# Patient Record
Sex: FEMALE | Race: WHITE
[De-identification: names, ages, dates, MRNs, and addresses within clinical notes are randomized per-mention and may not be internally consistent; named-entity substitution may affect disease eponyms.]

---

## 2018-02-16 ENCOUNTER — HOSPITAL ENCOUNTER (EMERGENCY)
Dept: HOSPITAL 61 - ER | Age: 38
Discharge: HOME | End: 2018-02-16
Payer: COMMERCIAL

## 2018-02-16 DIAGNOSIS — B34.9: Primary | ICD-10-CM

## 2018-02-16 DIAGNOSIS — Z20.828: ICD-10-CM

## 2018-02-16 LAB
INFLUENZA A PATIENT: NEGATIVE
INFLUENZA B PATIENT: NEGATIVE
OBC FLU: (no result)

## 2018-02-16 PROCEDURE — 87804 INFLUENZA ASSAY W/OPTIC: CPT

## 2018-02-16 PROCEDURE — 99284 EMERGENCY DEPT VISIT MOD MDM: CPT

## 2018-02-16 RX ADMIN — IBUPROFEN 1 MG: 800 TABLET ORAL at 17:11

## 2020-12-03 ENCOUNTER — HOSPITAL ENCOUNTER (EMERGENCY)
Dept: HOSPITAL 61 - ER | Age: 40
Discharge: HOME | End: 2020-12-03
Payer: COMMERCIAL

## 2020-12-03 VITALS — SYSTOLIC BLOOD PRESSURE: 134 MMHG | DIASTOLIC BLOOD PRESSURE: 100 MMHG

## 2020-12-03 VITALS — BODY MASS INDEX: 32.31 KG/M2 | HEIGHT: 59 IN | WEIGHT: 160.28 LBS

## 2020-12-03 DIAGNOSIS — M54.42: ICD-10-CM

## 2020-12-03 DIAGNOSIS — Z90.710: ICD-10-CM

## 2020-12-03 DIAGNOSIS — M54.41: ICD-10-CM

## 2020-12-03 DIAGNOSIS — G89.29: Primary | ICD-10-CM

## 2020-12-03 PROCEDURE — 99284 EMERGENCY DEPT VISIT MOD MDM: CPT

## 2020-12-03 PROCEDURE — 96372 THER/PROPH/DIAG INJ SC/IM: CPT

## 2020-12-03 NOTE — ED.ADGEN
Past Medical History


Past Medical History:  Other


Additional Past Medical Histor:  VAGINAL AND RECTAL PROLAPSE


Past Surgical History:  Hysterectomy


Additional Past Surgical Histo:  AUGUST 12, 2020 HYSTERECTOMY


Smoking Status:  Never Smoker


Alcohol Use:  None


Drug Use:  None





General Adult


EDM:


Chief Complaint:  BACK PAIN OR INJURY





HPI:


HPI:





Patient is a 40  year old female, accompanied by a family member, who presents 

emergency department with complaints of low back pain that shoots into both of 

her legs.  Patient states she was seen at [facility] 2 days ago and had 

MRI and was diagnosed with bulging disks.  She denies any loss of bowel or blad

audie control, or saddle anesthesia.  She denies any fever, cough, shortness of 

breath, or weakness of her lower extremities.  Patient currently rates her pain 

a 10 out of 10 on the pain scale, she denies any alleviating factors.  She 

denies any recent injury or fall.





Review of Systems:


Review of Systems:


Complete ROS is negative unless otherwise noted in HPI.





Current Medications:





Current Medications








 Medications


  (Trade)  Dose


 Ordered  Sig/Quentin  Start Time


 Stop Time Status Last Admin


Dose Admin


 


 Methylprednisolone


 Sodium Succinate


  (SOLU-Medrol


 125MG VIAL)  125 mg  1X  ONCE  12/3/20 22:30


 12/3/20 22:31 DC 12/3/20 22:10


125 MG


 


 Morphine Sulfate


  (Morphine


 Sulfate)  5 mg  1X  ONCE  12/3/20 22:30


 12/3/20 22:31 DC 12/3/20 22:10


5 MG











Allergies:


Allergies:





Allergies








Coded Allergies Type Severity Reaction Last Updated Verified


 


  No Known Drug Allergies    9/20/15 No











Physical Exam:


PE:


See Above


Constitutional: Well developed, well nourished, no acute distress, non-toxic 

appearance, appears uncomfortable. []


HENT: Normocephalic, atraumatic, bilateral external ears normal, nose normal. []


Eyes: PERRLA, EOMI, conjunctiva normal, no discharge. [] 


Neck: Normal range of motion, no stridor. [] 


Cardiovascular:Heart rate regular rhythm


Lungs & Thorax:  Respirations even and unlabored, no retractions, no respiratory

 distress


Back: Increased pain with movement of bilateral lower extremities, no bony 

tenderness, no step-off, no crepitus


Skin: Warm, dry, no erythema, no rash. [] 


Extremities: No cyanosis, ROM intact, no edema. [] 


Neurologic: Alert and oriented X 3, no focal deficits noted. []


Psychologic: Affect normal, judgement normal, mood normal. []





Current Patient Data:


Vital Signs:





                                   Vital Signs








  Date Time  Temp Pulse Resp B/P (MAP) Pulse Ox O2 Delivery O2 Flow Rate FiO2


 


12/3/20 21:32 98.2 101 20 134/100 (111) 100 Room Air  





 98.2       











EKG:


EKG:


[]





Heart Score:


Risk Factors:


Risk Factors:  DM, Current or recent (<one month) smoker, HTN, HLP, family 

history of CAD, obesity.


Risk Scores:


Score 0 - 3:  2.5% MACE over next 6 weeks - Discharge Home


Score 4 - 6:  20.3% MACE over next 6 weeks - Admit for Clinical Observation


Score 7 - 10:  72.7% MACE over next 6 weeks - Early Invasive Strategies





Radiology/Procedures:


Radiology/Procedures:


[]





Course & Med Decision Making:


Course & Med Decision Making


Pertinent Labs and Imaging studies reviewed. (See chart for details)


40-year-old female presented to the emergency department with complaints of 

bilateral low back pain that radiated to her legs.


Patient was given an IM injection of morphine and Solu-Medrol.


Patient reported complete resolution of the pain after his medications.


Prescriptions were written for naproxen and a prednisone taper.  The patient was

 provided with neurosurgery's information for follow-up.  She was encouraged to 

return the ER if symptoms worsen or fever develop.





Patient verbalized an understanding of home care, medications, follow-up, and 

return to ED instructions and was in agreement with the plan of care.


[]





I have reviewed the PA/NP's note and Plan of Care.  I was available for 

consultation as needed during the patient's visit in the emergency department.  

I agree with the clinical impression, plans and disposition.





Dragon Disclaimer:


Dragon Disclaimer:


This electronic medical record was generated, in whole or in part, using a voice

 recognition dictation system.





Departure


Departure


Impression:  


   Primary Impression:  


   Chronic low back pain with bilateral sciatica


Disposition:  01 DC HOME SELF CARE/HOMELESS


Condition:  STABLE


Referrals:  


NO PCP (PCP)








ÁNGELA NIÑO MD


Patient Instructions:  Chronic Back Pain





Additional Instructions:  


Fill the prescriptions take as directed for back pain.  Recommend that you call 

our neurosurgery and Dr. Niño for further evaluation and treatment of your 

chronic back pain due to bulging disks.  Return to the ER if your symptoms 

worsen or fever develops.


Scripts


Naproxen (NAPROXEN) 500 Mg Tablet


1 TAB PO BID PRN for PAIN for 10 Days, #20 TAB 0 Refills


   Prov: MARION CASTILLO         12/3/20 


Prednisone (PREDNISONE) 20 Mg Tablet


1 TAB PO UD for 12 Days, #15 TAB


   2 tabs by mouth days 1,2,3


   then 1.5 tabs by mouth days 4,5,6


   then 1 tab by mouth days 7,8,9


   then 0.5 tab by mouth day 10,11,12


   Prov: MARION CASTILLO         12/3/20





Problem Qualifiers








   Primary Impression:  


   Chronic low back pain with bilateral sciatica


   Back pain laterality:  bilateral  Qualified Codes:  M54.42 - Lumbago with 

   sciatica, left side; M54.41 - Lumbago with sciatica, right side; G89.29 - 

   Other chronic pain








MARION CASTILLO        Dec 3, 2020 22:16


HONG GAO MD               Dec 4, 2020 00:25

## 2020-12-06 ENCOUNTER — HOSPITAL ENCOUNTER (EMERGENCY)
Dept: HOSPITAL 61 - ER | Age: 40
Discharge: HOME | End: 2020-12-06
Payer: COMMERCIAL

## 2020-12-06 VITALS — DIASTOLIC BLOOD PRESSURE: 80 MMHG | SYSTOLIC BLOOD PRESSURE: 151 MMHG

## 2020-12-06 VITALS — WEIGHT: 158.73 LBS | BODY MASS INDEX: 32 KG/M2 | HEIGHT: 59 IN

## 2020-12-06 DIAGNOSIS — Z90.710: ICD-10-CM

## 2020-12-06 DIAGNOSIS — M54.32: Primary | ICD-10-CM

## 2020-12-06 DIAGNOSIS — F41.9: ICD-10-CM

## 2020-12-06 DIAGNOSIS — F31.9: ICD-10-CM

## 2020-12-06 LAB
APTT PPP: YELLOW S
BACTERIA #/AREA URNS HPF: (no result) /HPF
BILIRUB UR QL STRIP: NEGATIVE
FIBRINOGEN PPP-MCNC: CLEAR MG/DL
NITRITE UR QL STRIP: NEGATIVE
PH UR STRIP: 6 [PH]
PROT UR STRIP-MCNC: NEGATIVE MG/DL
RBC #/AREA URNS HPF: (no result) /HPF (ref 0–2)
UROBILINOGEN UR-MCNC: 0.2 MG/DL
WBC #/AREA URNS HPF: (no result) /HPF (ref 0–4)

## 2020-12-06 PROCEDURE — 81001 URINALYSIS AUTO W/SCOPE: CPT

## 2020-12-06 PROCEDURE — 99283 EMERGENCY DEPT VISIT LOW MDM: CPT

## 2020-12-06 PROCEDURE — 81025 URINE PREGNANCY TEST: CPT

## 2020-12-06 NOTE — PHYS DOC
Past Medical History


Past Medical History:  Anxiety, Bipolar, Depression, Other


Additional Past Medical Histor:  VAGINAL AND RECTAL PROLAPSE, BULGING DISC


Past Surgical History:  Hysterectomy


Additional Past Surgical Histo:  AUGUST 12, 2020 HYSTERECTOMY, RECTAL PROLAPSE 

REPAIR


Smoking Status:  Never Smoker


Alcohol Use:  None


Drug Use:  None





General Adult


EDM:


Chief Complaint:  GROIN PAIN





HPI:


HPI:





Patient is a 40 year old female who presents with left groin pain that radiates 

into her left leg. She reports the pain began after she had surgery in August 

for a hysterectomy and rectal prolapse repair. Due to pain not resolving she had

an MRI done at  which showed a bulging disc. Pain is described as burning or 

tingling. She reports pain is worse with ice/heat and there is little 

improvement after taking tylenol/ibuprofen. Pain is preventing her from doing 

activities of daily living. She has an appointment in 2-3 days with her PCP and 

an appointment with pain management on Thursday. She also describes urinary 

frequency with a foul smell recently.





Review of Systems:


Review of Systems:





Constitutional: Denies fever or chills 


HENT: Denies nasal congestion or sore throat


Respiratory: Denies cough or shortness of breath 


Cardiovascular: Denies chest pain or palpitations


GI: Reports abdominal pain; denies nausea or vomiting


: Denies dysuria or hematuria, reports urinary frequency


Musculoskeletal: Reports back pain and joint pain


Integument: Denies rash or skin lesions, reports 2 bruises 


Neurologic: Denies headache, focal weakness or sensory changes





Complete systems were reviewed and found to be within normal limits, except as 

documented in this note.





Allergies:


Allergies:





Allergies








Coded Allergies Type Severity Reaction Last Updated Verified


 


  No Known Drug Allergies    9/20/15 No











Physical Exam:


PE:





Constitutional: Well developed, well nourished, no acute distress, non-toxic 

appearance


HENT: Normocephalic, atraumatic


Eyes: PERRL, EOMI, conjunctiva normal, no discharge


Neck: Normal range of motion, no tenderness, supple


Lungs & Thorax:  No respiratory distress, equal chest rise and fall


Abdomen: Soft, no tenderness, no guarding/rebound tenderness/distention; pelvis 

stable and nontender


Skin: Warm, dry, no erythema, no rash


Back: No midline tenderness, left paraspinal tenderness noted, no CVA tenderness


Extremities: No tenderness, ROM intact, no edema, pain to left leg with straight

leg raise


Neurologic: Alert and oriented X 3,  no focal deficits noted


Psychologic: Affect normal, judgment normal





Current Patient Data:


Vital Signs:





                                   Vital Signs








  Date Time  Temp Pulse Resp B/P (MAP) Pulse Ox O2 Delivery O2 Flow Rate FiO2


 


12/6/20 19:07 98.1 113 16 151/80 (103) 97 Room Air  





 98.1       











Course & Med Decision Making:


Course & Med Decision Making


Pertinent Labs reviewed. (See chart for details)





Patient is a 40 year old female who presents with left groin pain that radiates 

into her left leg. 





Patient has appointments with PCP and pain management this week, but is needing 

something for pain in the meantime. Percocet provided in the ED, her son drove 

her. Prescriptions for percocet and flexeril provided for home.





Associated foul smelling urine reported, UA ordered. UA shows no signs of 

infection.





Patient stable for discharge with outpatient follow-up with PCP/pain management.

Discussed findings and plan with patient and family, who acknowledge un

derstanding and agreement.





Dragon Disclaimer:


Dragon Disclaimer:


This electronic medical record was generated, in whole or in part, using a voice

recognition dictation system.





Departure


Departure


Impression:  


   Primary Impression:  


   Sciatica of left side


Disposition:  01 DC HOME SELF CARE/HOMELESS


Condition:  STABLE


Referrals:  


NO PCP (PCP)








TORSTEN CHAIREZ MD


Patient Instructions:  Sciatica, Easy-to-Read





Additional Instructions:  


Follow up with primary care provider. Follow up with  pain management or with 

Dr. Torsten Chairez (pain management)- referral above


Scripts


Cyclobenzaprine Hcl (CYCLOBENZAPRINE HCL) 10 Mg Tablet


1 TAB PO TID PRN for MUSCLE PAIN, #14 TAB


   Prov: ONEYDA LUNA DO         12/6/20 


Oxycodone/Apap 5-325 (PERCOCET 5-325 MG TABLET **) 1 Each Tablet


0.5 TAB PO PRN Q6HRS PRN for PAIN, #6 TAB 0 Refills


   Prov: ONEYDA LUNA DO         12/6/20











ONEYDA LUNA DO              Dec 6, 2020 19:41

## 2021-03-19 ENCOUNTER — HOSPITAL ENCOUNTER (EMERGENCY)
Dept: HOSPITAL 61 - ER | Age: 41
Discharge: HOME | End: 2021-03-19
Payer: COMMERCIAL

## 2021-03-19 VITALS — SYSTOLIC BLOOD PRESSURE: 149 MMHG | DIASTOLIC BLOOD PRESSURE: 100 MMHG

## 2021-03-19 VITALS — WEIGHT: 160.28 LBS | HEIGHT: 58 IN | BODY MASS INDEX: 33.64 KG/M2

## 2021-03-19 DIAGNOSIS — F32.9: ICD-10-CM

## 2021-03-19 DIAGNOSIS — F41.9: ICD-10-CM

## 2021-03-19 DIAGNOSIS — M54.42: ICD-10-CM

## 2021-03-19 DIAGNOSIS — Z90.710: ICD-10-CM

## 2021-03-19 DIAGNOSIS — Z98.890: ICD-10-CM

## 2021-03-19 DIAGNOSIS — G89.29: Primary | ICD-10-CM

## 2021-03-19 LAB
AMPHETAMINE/METHAMPHETAMINE: (no result)
APTT PPP: YELLOW S
BACTERIA #/AREA URNS HPF: (no result) /HPF
BARBITURATES UR-MCNC: (no result) UG/ML
BENZODIAZ UR-MCNC: (no result) UG/L
BILIRUB UR QL STRIP: NEGATIVE
CANNABINOIDS UR-MCNC: (no result) UG/L
COCAINE UR-MCNC: (no result) NG/ML
FIBRINOGEN PPP-MCNC: CLEAR MG/DL
METHADONE SERPL-MCNC: (no result) NG/ML
NITRITE UR QL STRIP: NEGATIVE
OPIATES UR-MCNC: (no result) NG/ML
PCP SERPL-MCNC: (no result) MG/DL
PH UR STRIP: 7 [PH]
PROT UR STRIP-MCNC: NEGATIVE MG/DL
RBC #/AREA URNS HPF: 0 /HPF (ref 0–2)
U PREG PATIENT: NEGATIVE
UROBILINOGEN UR-MCNC: 0.2 MG/DL
WBC #/AREA URNS HPF: 0 /HPF (ref 0–4)

## 2021-03-19 PROCEDURE — 80307 DRUG TEST PRSMV CHEM ANLYZR: CPT

## 2021-03-19 PROCEDURE — 99283 EMERGENCY DEPT VISIT LOW MDM: CPT

## 2021-03-19 PROCEDURE — 96372 THER/PROPH/DIAG INJ SC/IM: CPT

## 2021-03-19 PROCEDURE — 81025 URINE PREGNANCY TEST: CPT

## 2021-03-19 PROCEDURE — 81001 URINALYSIS AUTO W/SCOPE: CPT

## 2021-03-19 NOTE — PHYS DOC
Past Medical History


Past Medical History:  Anxiety, Bipolar, Depression, Other


Additional Past Medical Histor:  VAGINAL AND RECTAL PROLAPSE, BULGING DISC


Past Surgical History:  Hysterectomy


Additional Past Surgical Histo:  AUGUST 12, 2020 HYSTERECTOMY, RECTAL PROLAPSE 

REPAIR


Smoking Status:  Never Smoker


Alcohol Use:  None


Drug Use:  None





General Adult


EDM:


Chief Complaint:  LOWER BACK PAIN OR INJURY





HPI:


HPI:


40-year-old female past medical history of bipolar disorder anxiety, presents 

the ED with her daughter (who drove her) complaints of sharp, left-sided low 

back pain that radiates to her buttocks and down posterior left leg, 

intermittent, waxing waning since August after patient had ENMA-BSO with rectal 

prolapse repair at .  Reports symptoms started after this and she was referred

to pain management.  Pain management performed "all that, CTs and MRI," that 

showed she had disc herniations.  Patient ports she received steroid injections 

from this but they are refusing to prescribe her any opioid pain medication.  Is

not taking any medicine for the pain.  Denies any associated trauma, IV drug 

use, alcohol abuse, weight loss, night sweats, saddle anesthesia, urinary or or 

bowel retention or incontinence.  Patient reports the pain causes her to "pee 

alot."





No history of trauma, history of IV drug use, history of cancer, history of 

sciatica, history of malignancy, history of immunocompromise state, neurologic 

complaints including saddle anesthesia, weakness or paresthesias, urinary 

retention, bowel or bladder incontinence, night pain, fever, chills or night 

sweats, anticoagulants or coagulopathy, prolonged steroid use, older age, 

presence of contusions or abrasions.





Review of Systems:


Review of Systems:


Constitutional:   Denies fever or chills. []


Eyes:   Denies change in visual acuity. []


HENT:   Denies nasal congestion or sore throat. [] 


Respiratory:   Denies cough or shortness of breath. [] 


Cardiovascular:   Denies chest pain or edema. [] 


GI:   Denies abdominal pain, nausea, vomiting, bloody stools or diarrhea. [] 


:  Denies dysuria, hematuria, 


Musculoskeletal:   Denies CVA tenderness, joint deformity or swelling


Integument:   Denies rash or diaphoresis


Neurologic:   Denies headache, focal weakness or sensory changes. [] 


Endocrine:   Denies polyuria or polydipsia. [] 


Lymphatic:  Denies swollen glands. [] 


Psychiatric:  Denies depression or anxiety. []





Heart Score:


C/O Chest Pain:  No


Risk Factors:


Risk Factors:  DM, Current or recent (<one month) smoker, HTN, HLP, family 

history of CAD, obesity.


Risk Scores:


Score 0 - 3:  2.5% MACE over next 6 weeks - Discharge Home


Score 4 - 6:  20.3% MACE over next 6 weeks - Admit for Clinical Observation


Score 7 - 10:  72.7% MACE over next 6 weeks - Early Invasive Strategies





Allergies:


Allergies:





Allergies








Coded Allergies Type Severity Reaction Last Updated Verified


 


  No Known Drug Allergies    9/20/15 No











Physical Exam:


PE:





Constitutional: Well developed, well nourished, swaying back and forth rubbing 

her leg and abck


HENT: Normocephalic, atraumatic,


Eyes: EOMI, conjunctiva normal, no discharge.  


Neck: Normal range of motion,  supple, 


Cardiovascular: S1/2 present, regular rhythm


Lungs & Thorax: Speaking in full sentences, bilateral equal chest rise, no 

tachypnea or increased work of breathing


Abdomen:  soft, no tenderness, 


Skin: Warm, dry, no erythema, no rash. [] 


Back: No CVA tenderness, cannot reproduce patient's pain but origin is over left

 SI joint -no midline pain or step-offs


Extremities: No tenderness, no cyanosis, no lower extremity edema


Neurologic: Alert and oriented X 3, normal motor function, normal sensory fun

ction, no focal deficits noted. []


Psychologic: Affect normal, judgement normal, mood normal. []





EKG:


EKG:


[]





Radiology/Procedures:


Radiology/Procedures:


[]





Course & Med Decision Making:


Course & Med Decision Making


Pertinent Labs and Imaging studies reviewed. (See chart for details)





Concern for chronic left-sided low back pain for SI joint with sciatica in the 

setting of known disc herniations.  Patient with no neurologic deficits.  

Urinalysis with no RBCs or WBCs, few bacteria, negative for nitrates or 

leukocyte esterase.  Patient calm and in no distress.  Will discharge home with 

strict ED return precautions were given for saddle anesthesia, urine or bowel 

retention or incontinence, weight loss, night sweats or neurologic deficits. 

Encouraged urgent outpatient follow-up with PMD and pain management and 

neurosurgery.  Life-threatening processes were considered but are low suspicion 

at this time, given history, physical exam and ED workup. Pt was educated on all

 prescription medications and adverse effects.  All patient's questions were 

answered and pt was stable at time of discharge.





Life/limb-threatening differential includes but is not limited to, aortic 

dissection/aneurysm, cauda equina syndrome, transverse myelitis, spinal 

cord/epidural compression syndromes, discitis, spinal stenosis, epidural abscess

 or hematoma, osteomyelitis, disc herniation, surgical abdomen, stable or 

unstable fracture, renal/ureteral colic, sepsis, meningitis, musculoskeletal 

injury, traumatic injury, intraabdominal/retroperitoneal or pelvic bleeding.





I spoken with the patient and her caregivers.  I explained the patient's 

condition, diagnoses and treatment plan based on the information available to me

 at this time.  I have answered the patient and her caregiver's questions and 

addressed any concerns.  The patient and her caregivers have a good 

understanding of patient's diagnosis, condition and treatment plan as can be exp

ected at this point.  Vital signs have been stable.  Patient's condition is 

stable and appropriate for discharge from the emergency department. 





Patient will pursue further outpatient evaluation with primary care physician or

 other designated or consulting physician as outlined in the discharge 

instructions.  The patient and/or caregivers are agreeable to this plan of care 

and follow-up instructions have been explained in detail.  The patient and/or 

caregivers have received these instructions in written form and have expressed 

an understanding of the discharge instructions.  The patient and/or caregivers 

are aware that any significant change of condition or worsening of symptoms 

should prompt immediate return to this or the closest emergency department or c

all to 911.





Dragon Disclaimer:


Dragon Disclaimer:


This electronic medical record was generated, in whole or in part, using a voice

 recognition dictation system.





Departure


Departure


Impression:  


   Primary Impression:  


   Chronic left SI joint pain


   Additional Impression:  


   Left-sided low back pain with sciatica


Disposition:  01 DC HOME SELF CARE/HOMELESS


Condition:  STABLE


Referrals:  


NO PCP (PCP)


FOLLOW UP WITH FAMILY MEDICINE:





Family Medicine


Address:


[address]


[place] [address]


Phone:


[phone]


Patient Instructions:  Sacroiliac Joint Dysfunction, Sciatica





Additional Instructions:  


FOLLOW UP WITH PAIN MANAGEMENT:





[facility] 


Pain Management


Address:


[address]


[place] [address]


Phone:


[phone]





FOLLOW UP WITH  NEUROSURGERY:





Neurological Surgery


[facility]


Address:


[address]


[place] [address]


Phone:


[phone]





EMERGENCY DEPARTMENT GENERAL DISCHARGE INSTRUCTIONS





Thank you for coming to [facility] Emergency Department (ED) 

today and 


trusting us with you care.  We trust that you had a positive experience in our 

Emergency 


Department.  If you wish to speak to the department management, you may call the

 Director at 


[phone].





YOUR FOLLOW UP INSTRUCTIONS ARE AS FOLLOWS:





1.  Do you have a private Doctor?  If you do not have a private doctor, please 

ask for a 


resource list of physicians or clinics that may be able to assist you with 

follow up care.





2.  The Emergency Physicain has interpreted your x-rays.  The X-Ray specialist 

will also 


review them.  If there is a change in the findings, you will be notified in 48 

hours when at 


all possible.





3.  A lab test or culture has been done, your results will be reviewed and you 

will be 


notified if you need a change in treatment.





ADDITIONAL INSTRUCTIONS AND INFORMATION:





1.  Your care today has been supervised by a physician who is specially trained 

in emergency 


care.  Many problems require more than one evaluation for a complete diagnosis 

and 


treatment.  We recommend that you schedule your follow up appointment as 

recommended to 


ensure complete treatment of you illness or injury.  If you are unable to obtain

 follow up 


care and continue to have a problem, or if your condition worsens, we recommend 

that you 


return to the ED.





2.  We are not able to safely determine your condition over the phone nor are we

 able to 


give sound medical advice over the phone.  For these safety reasons, if you call

 for medical 


advice we will ask you to come to the ED for further evaluation.





3.  If you have any questions regarding these discharge instructions please call

 the ED at 


[phone].





SAFETY INFORMATION:





In the interest of safety, wellness, and injury prevention; we encourage you to 

wear your 


sealbelt, if you smoke; quite smoking, and we encourage family to use a 

protective helmet 


for bicycling and other sporting events that present an increased risk for head 

injury.





IF YOUR SYMPTOMS WORSEN OR NEW SYMPTOMS DEVELOP, OR YOU HAVE CONCERNS ABOUT YOUR

 CONDITION; 


OR IF YOUR CONDITION WORSENS WHILE YOU ARE WAITING FOR YOUR FOLLOW UP 

APPOINTMENT; EITHER 


CONTACT YOUR PRIMARY CARE DOCTOR, THE PHYSICIAN WHOSE NAME AND NUMBER YOU WERE 

GIVEN, OR 


RETURN TO THE ED IMMEDIATELY.


Scripts


Lidocaine (Lido Jacques) 1 Each Adh..patch


1 EACH TP DAILY for 5 Days, #5 PATCH


   5% pathc, Apply 1 patch for 12 hours, remove for another 12


   hours.


   May repeat, 1 patch per day as instructed above.


   Prov: KAYLA MATTHEWS DO         3/19/21 


Methocarbamol (ROBAXIN-750) 750 Mg Tablet


750 TAB PO TID for back pain, #20 TAB 0 Refills


   Prov: KAYLA MATTHEWS DO         3/19/21











KAYLA MATTHEWS DO               Mar 19, 2021 18:37